# Patient Record
Sex: FEMALE | Race: WHITE | NOT HISPANIC OR LATINO | Employment: STUDENT | URBAN - NONMETROPOLITAN AREA
[De-identification: names, ages, dates, MRNs, and addresses within clinical notes are randomized per-mention and may not be internally consistent; named-entity substitution may affect disease eponyms.]

---
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
## 2022-02-28 ENCOUNTER — OFFICE VISIT (OUTPATIENT)
Dept: OTOLARYNGOLOGY | Facility: CLINIC | Age: 22
End: 2022-02-28

## 2022-02-28 VITALS — SYSTOLIC BLOOD PRESSURE: 115 MMHG | TEMPERATURE: 97.6 F | DIASTOLIC BLOOD PRESSURE: 78 MMHG | HEART RATE: 81 BPM

## 2022-02-28 DIAGNOSIS — H61.23 BILATERAL IMPACTED CERUMEN: Primary | ICD-10-CM

## 2022-02-28 PROCEDURE — 69210 REMOVE IMPACTED EAR WAX UNI: CPT | Performed by: NURSE PRACTITIONER

## 2022-02-28 NOTE — PROGRESS NOTES
PROCEDURE NOTE    LOCATION: [facility]  PROVIDER: YADY Brown   PREOPERATIVE DIAGNOSIS: Cerumen Impaction bilaterally   POSTOPERATIVE DIAGNOSIS: Same  PROCEDURE: Cerumen removal  ANESTHESIA: None   REASON FOR THE OPERATION: The patient verbally consented to intervention after a full discussion of risks, benefits, and alternatives. No guarantees were made or implied.   DETAILS OF THE OPERATION: The patient was reclined in the procedure room chair. The binocular microscope was used to visualize the ear canal and tympanic membrane. Cerumen was then removed from the both ears using a alligator forceps and suction. Findings: Bilateral EACs clear without lesion.  Bilateral tympanic membranes intact without effusion.  Myringosclerosis present to the right tympanic membrane. Patient tolerated procedure well and was without complications

## 2024-04-22 NOTE — PROGRESS NOTES
Procedure   Procedures    PROCEDURE NOTE    LOCATION: [facility]  PROVIDER: YADY Brown   PREOPERATIVE DIAGNOSIS: Cerumen Impaction bilaterally   POSTOPERATIVE DIAGNOSIS: Same  PROCEDURE: Cerumen removal  ANESTHESIA: None   REASON FOR THE OPERATION: The patient verbally consented to intervention after a full discussion of risks, benefits, and alternatives. No guarantees were made or implied.   DETAILS OF THE OPERATION: The patient was reclined in the procedure room chair. The binocular microscope was used to visualize the ear canal and tympanic membrane. Cerumen was then removed from the both ears using a suction. Findings: Bilateral EACs clear without lesion.  Bilateral tympanic membranes intact without effusions. Patient tolerated procedure well and was without complications     PAIN SCALE 6 OF 10.